# Patient Record
Sex: FEMALE | Race: WHITE | NOT HISPANIC OR LATINO | URBAN - NONMETROPOLITAN AREA
[De-identification: names, ages, dates, MRNs, and addresses within clinical notes are randomized per-mention and may not be internally consistent; named-entity substitution may affect disease eponyms.]

---

## 2025-05-16 ENCOUNTER — OFFICE VISIT (OUTPATIENT)
Dept: URGENT CARE | Age: 80
End: 2025-05-16
Payer: MEDICARE

## 2025-05-16 VITALS
SYSTOLIC BLOOD PRESSURE: 150 MMHG | WEIGHT: 202.16 LBS | TEMPERATURE: 99 F | OXYGEN SATURATION: 97 % | DIASTOLIC BLOOD PRESSURE: 82 MMHG | HEART RATE: 76 BPM | RESPIRATION RATE: 18 BRPM

## 2025-05-16 DIAGNOSIS — R03.0 ELEVATED BLOOD PRESSURE READING: ICD-10-CM

## 2025-05-16 DIAGNOSIS — N30.01 ACUTE CYSTITIS WITH HEMATURIA: Primary | ICD-10-CM

## 2025-05-16 DIAGNOSIS — R30.0 DYSURIA: ICD-10-CM

## 2025-05-16 DIAGNOSIS — R35.0 FREQUENCY OF URINATION: ICD-10-CM

## 2025-05-16 LAB
POC APPEARANCE, URINE: CLEAR
POC BILIRUBIN, URINE: NEGATIVE
POC BLOOD, URINE: ABNORMAL
POC COLOR, URINE: ABNORMAL
POC GLUCOSE, URINE: NEGATIVE MG/DL
POC KETONES, URINE: NEGATIVE MG/DL
POC LEUKOCYTES, URINE: ABNORMAL
POC NITRITE,URINE: POSITIVE
POC PH, URINE: 6 PH
POC PROTEIN, URINE: ABNORMAL MG/DL
POC SPECIFIC GRAVITY, URINE: 1.02
POC UROBILINOGEN, URINE: 0.2 EU/DL

## 2025-05-16 RX ORDER — CEPHALEXIN 500 MG/1
500 CAPSULE ORAL 2 TIMES DAILY
Qty: 20 CAPSULE | Refills: 0 | Status: SHIPPED | OUTPATIENT
Start: 2025-05-16 | End: 2025-05-26

## 2025-05-16 RX ORDER — AMITRIPTYLINE HYDROCHLORIDE 10 MG/1
10 TABLET, FILM COATED ORAL
COMMUNITY
Start: 2024-10-03 | End: 2025-10-04

## 2025-05-16 RX ORDER — CEPHALEXIN 500 MG/1
500 CAPSULE ORAL 2 TIMES DAILY
Qty: 14 CAPSULE | Refills: 0 | Status: SHIPPED | OUTPATIENT
Start: 2025-05-16 | End: 2025-05-16

## 2025-05-16 ASSESSMENT — ENCOUNTER SYMPTOMS
OCCASIONAL FEELINGS OF UNSTEADINESS: 0
LOSS OF SENSATION IN FEET: 0
DEPRESSION: 0

## 2025-05-16 NOTE — PROGRESS NOTES
Subjective   Patient ID: Connie Mcfarland is a 79 y.o. female. They present today with a chief complaint of Other (Burning and frequent urination in April, never got checked. Still has burning and frequency).    History of Present Illness  See MDM      History provided by:  Patient   used: No        Past Medical History  Allergies as of 05/16/2025 - Reviewed 05/16/2025   Allergen Reaction Noted    Ace inhibitors Cough, Other, and Rash 05/07/2005    Nitrofurantoin Itching 05/10/2022       Prescriptions Prior to Admission[1]     Medical History[2]    Surgical History[3]     reports that she has never smoked. She has never used smokeless tobacco. She reports that she does not drink alcohol and does not use drugs.    Review of Systems  Review of Systems   All other systems reviewed and are negative.                                 Objective    Vitals:    05/16/25 1803   BP: 150/82   BP Location: Right arm   Patient Position: Sitting   BP Cuff Size: Adult   Pulse: 76   Resp: 18   Temp: 37.2 °C (99 °F)   TempSrc: Oral   SpO2: 97%   Weight: 91.7 kg (202 lb 2.6 oz)     No LMP recorded. Patient is postmenopausal.    Physical Exam  Vitals and nursing note reviewed.   Constitutional:       General: She is not in acute distress.     Appearance: Normal appearance. She is not ill-appearing, toxic-appearing or diaphoretic.      Comments: Pleasant and well-appearing   HENT:      Head: Normocephalic and atraumatic.      Mouth/Throat:      Mouth: Mucous membranes are moist.   Eyes:      General: No scleral icterus.        Right eye: No discharge.         Left eye: No discharge.      Extraocular Movements: Extraocular movements intact.      Conjunctiva/sclera: Conjunctivae normal.      Pupils: Pupils are equal, round, and reactive to light.   Cardiovascular:      Rate and Rhythm: Normal rate and regular rhythm.      Pulses: Normal pulses.      Heart sounds: Normal heart sounds. No murmur heard.     No friction rub.  No gallop.   Pulmonary:      Effort: Pulmonary effort is normal. No respiratory distress.      Breath sounds: No wheezing, rhonchi or rales.   Abdominal:      General: Abdomen is flat.      Tenderness: There is no abdominal tenderness. There is no right CVA tenderness, left CVA tenderness, guarding or rebound.   Musculoskeletal:         General: Normal range of motion.      Cervical back: Normal range of motion and neck supple. No rigidity or tenderness.   Lymphadenopathy:      Cervical: No cervical adenopathy.   Skin:     General: Skin is warm and dry.      Capillary Refill: Capillary refill takes less than 2 seconds.   Neurological:      General: No focal deficit present.      Mental Status: She is alert.      Gait: Gait normal.   Psychiatric:         Mood and Affect: Mood normal.         Behavior: Behavior normal.         Procedures    Point of Care Test & Imaging Results from this visit  Results for orders placed or performed in visit on 05/16/25   POCT UA Automated manually resulted   Result Value Ref Range    POC Color, Urine Yari (A) Straw, Yellow, Light-Yellow    POC Appearance, Urine Clear Clear    POC Glucose, Urine NEGATIVE NEGATIVE mg/dl    POC Bilirubin, Urine NEGATIVE NEGATIVE    POC Ketones, Urine NEGATIVE NEGATIVE mg/dl    POC Specific Gravity, Urine 1.020 1.005 - 1.035    POC Blood, Urine MODERATE (2+) (A) NEGATIVE    POC PH, Urine 6.0 No Reference Range Established PH    POC Protein, Urine TRACE (A) NEGATIVE mg/dl    POC Urobilinogen, Urine 0.2 0.2, 1.0 EU/DL    Poc Nitrite, Urine POSITIVE (A) NEGATIVE    POC Leukocytes, Urine MODERATE (2+) (A) NEGATIVE      Imaging  No results found.    Cardiology, Vascular, and Other Imaging  No other imaging results found for the past 2 days      Diagnostic study results (if any) were reviewed by Precious Madera PA-C.    Assessment/Plan   Allergies, medications, history, and pertinent labs/EKGs/Imaging reviewed by Precious Madera PA-C.     Medical Decision  Making  79-year-old female with past medical history of hypertension and kidney stone presents with complaint of dysuria, urgency and frequency.  She had similar symptoms in April for which she took Azo and drink a lot of fluids.  She states that symptoms completely resolved.  Symptoms recurred on Wednesday of this week.  Denies fever, abdominal pain, flank pain, nausea or vomiting, lightheadedness or malaise.   She states in the past she has had 2 take antibiotic for longer term like 10 days when she has had UTI.  She is not immunocompromised or diabetic.  She states symptoms are not similar to previous kidney stone.  Physical dam is benign.  No features of pyelonephritis, sepsis, ureterolithiasis or other emergency.  Discussed avoidance of Azo if she is not seeking care in the future as it can mask symptoms.  Chart review with normal renal function.  No need for dose adjust.  Will provide prescription for 10 days of Keflex.  Patient states she has tolerated this well in the past before.  Urine culture sent.  Encouraged follow-up with primary care provider.  Discussed expected course, indications for return or for presentation to emergency department.  Discharged good condition agreeable to plan as discussed.      Orders and Diagnoses  Diagnoses and all orders for this visit:  Acute cystitis with hematuria  -     cephalexin (Keflex) 500 mg capsule; Take 1 capsule (500 mg) by mouth 2 times a day for 10 days.  -     Urine Culture  Dysuria  -     POCT UA Automated manually resulted  Frequency of urination  -     POCT UA Automated manually resulted  Elevated blood pressure reading      Medical Admin Record      Patient disposition: Home    Electronically signed by Precious Madera PA-C  6:41 PM           [1] (Not in a hospital admission)   [2] No past medical history on file.  [3] No past surgical history on file.

## 2025-05-16 NOTE — PATIENT INSTRUCTIONS
Thank you for visiting  urgent care.     Go to emergency department if you experience fever, chills, nausea, vomiting, generalized malaise, abdominal pain or flank pain or any other new or worsening symptoms.    You will be called in 2 to 3 days if urine culture indicates need for change of antibiotic.    Recheck blood pressure with primary care provider.     Follow-up with primary care provider for recheck of symptoms.

## 2025-05-18 LAB — BACTERIA UR CULT: ABNORMAL

## 2025-05-19 LAB — BACTERIA UR CULT: ABNORMAL
